# Patient Record
Sex: FEMALE | Race: WHITE | URBAN - METROPOLITAN AREA
[De-identification: names, ages, dates, MRNs, and addresses within clinical notes are randomized per-mention and may not be internally consistent; named-entity substitution may affect disease eponyms.]

---

## 2017-04-24 ENCOUNTER — RADIANT APPOINTMENT (OUTPATIENT)
Dept: BONE DENSITY | Facility: CLINIC | Age: 62
End: 2017-04-24
Payer: COMMERCIAL

## 2017-04-24 ENCOUNTER — RADIANT APPOINTMENT (OUTPATIENT)
Dept: MAMMOGRAPHY | Facility: CLINIC | Age: 62
End: 2017-04-24
Payer: COMMERCIAL

## 2017-04-24 ENCOUNTER — OFFICE VISIT (OUTPATIENT)
Dept: OBGYN | Facility: CLINIC | Age: 62
End: 2017-04-24
Payer: COMMERCIAL

## 2017-04-24 VITALS
HEIGHT: 62 IN | BODY MASS INDEX: 21.53 KG/M2 | SYSTOLIC BLOOD PRESSURE: 104 MMHG | DIASTOLIC BLOOD PRESSURE: 68 MMHG | WEIGHT: 117 LBS

## 2017-04-24 DIAGNOSIS — Z11.8 SCREENING FOR CHLAMYDIAL DISEASE: ICD-10-CM

## 2017-04-24 DIAGNOSIS — Z78.0 ASYMPTOMATIC POSTMENOPAUSAL STATE: ICD-10-CM

## 2017-04-24 DIAGNOSIS — M85.9 LOW BONE DENSITY: ICD-10-CM

## 2017-04-24 DIAGNOSIS — Z11.3 SCREEN FOR STD (SEXUALLY TRANSMITTED DISEASE): ICD-10-CM

## 2017-04-24 DIAGNOSIS — Z11.4 SCREENING FOR HUMAN IMMUNODEFICIENCY VIRUS: ICD-10-CM

## 2017-04-24 DIAGNOSIS — Z11.51 SCREENING FOR HUMAN PAPILLOMAVIRUS: ICD-10-CM

## 2017-04-24 DIAGNOSIS — Z01.419 ENCOUNTER FOR GYNECOLOGICAL EXAMINATION WITHOUT ABNORMAL FINDING: Primary | ICD-10-CM

## 2017-04-24 DIAGNOSIS — Z12.31 VISIT FOR SCREENING MAMMOGRAM: ICD-10-CM

## 2017-04-24 LAB — CALCIUM SERPL-MCNC: 9.4 MG/DL (ref 8.5–10.1)

## 2017-04-24 PROCEDURE — G0145 SCR C/V CYTO,THINLAYER,RESCR: HCPCS | Performed by: OBSTETRICS & GYNECOLOGY

## 2017-04-24 PROCEDURE — 82310 ASSAY OF CALCIUM: CPT | Performed by: OBSTETRICS & GYNECOLOGY

## 2017-04-24 PROCEDURE — 87389 HIV-1 AG W/HIV-1&-2 AB AG IA: CPT | Performed by: OBSTETRICS & GYNECOLOGY

## 2017-04-24 PROCEDURE — 99396 PREV VISIT EST AGE 40-64: CPT | Performed by: OBSTETRICS & GYNECOLOGY

## 2017-04-24 PROCEDURE — 87591 N.GONORRHOEAE DNA AMP PROB: CPT | Performed by: OBSTETRICS & GYNECOLOGY

## 2017-04-24 PROCEDURE — 77080 DXA BONE DENSITY AXIAL: CPT | Performed by: OBSTETRICS & GYNECOLOGY

## 2017-04-24 PROCEDURE — 86696 HERPES SIMPLEX TYPE 2 TEST: CPT | Performed by: OBSTETRICS & GYNECOLOGY

## 2017-04-24 PROCEDURE — 86695 HERPES SIMPLEX TYPE 1 TEST: CPT | Performed by: OBSTETRICS & GYNECOLOGY

## 2017-04-24 PROCEDURE — 86803 HEPATITIS C AB TEST: CPT | Performed by: OBSTETRICS & GYNECOLOGY

## 2017-04-24 PROCEDURE — 86780 TREPONEMA PALLIDUM: CPT | Performed by: OBSTETRICS & GYNECOLOGY

## 2017-04-24 PROCEDURE — 87491 CHLMYD TRACH DNA AMP PROBE: CPT | Performed by: OBSTETRICS & GYNECOLOGY

## 2017-04-24 PROCEDURE — 87340 HEPATITIS B SURFACE AG IA: CPT | Performed by: OBSTETRICS & GYNECOLOGY

## 2017-04-24 PROCEDURE — G0202 SCR MAMMO BI INCL CAD: HCPCS | Mod: TC

## 2017-04-24 PROCEDURE — 36415 COLL VENOUS BLD VENIPUNCTURE: CPT | Performed by: OBSTETRICS & GYNECOLOGY

## 2017-04-24 PROCEDURE — 82306 VITAMIN D 25 HYDROXY: CPT | Performed by: OBSTETRICS & GYNECOLOGY

## 2017-04-24 NOTE — PROGRESS NOTES
Lorri is a 61 year old  female who presents for annual exam.     Besides routine health maintenance,  she would like to discuss STD testing, new partner.    HPI: Patient is here today for her annual exam and STD screening due to new partner.  Her partner reports Jock's itch, and does not want to have intercourse when that is active. She has no history of STDs, and denies dyspareunia, pain, odor, discharge, external rashes or any symptoms at this time. She also reports that she did not start a calcium or vitamin D supplement last year. She has minimal dairy in her diet, but does feel she gets enough vit D from being outside. She has not had this checked, and her last bone density scan was maybe 20 years ago that was found to be low-normal.      The patient does not have a PCP        GYNECOLOGIC HISTORY:    No LMP recorded. Patient is postmenopausal.  Her current contraception method is: menopause.  She  reports that she has never smoked. She has never used smokeless tobacco.    Patient is sexually active.  STD testing offered?  Accepted  Last PHQ-9 score on record =   PHQ-9 SCORE 2016   Total Score 0     Last GAD7 score on record =   MADDI-7 SCORE 2016   Total Score 0     Alcohol Score = 3    HEALTH MAINTENANCE:  Cholesterol: several years ago per patient  Last Mammo: one year ago, Result: normal, Next Mammo: today   Pap: 2016 negative  Colonoscopy:  , Result: normal, Next Colonoscopy: every 2 years  Dexa:  A long time ago per patient    Health maintenance updated:  yes    HISTORY:  Obstetric History       T1      TAB0   SAB0   E0   M0   L1       # Outcome Date GA Lbr Blane/2nd Weight Sex Delivery Anes PTL Lv   1 Term 87    F -SEC   Y          There is no problem list on file for this patient.    Past Surgical History:   Procedure Laterality Date     COLONOSCOPY       HC TOOTH EXTRACTION W/FORCEP       TONSILLECTOMY       WRIST SURGERY Right     cyst excised  "     Social History   Substance Use Topics     Smoking status: Never Smoker     Smokeless tobacco: Never Used     Alcohol use 0.0 oz/week     0 Standard drinks or equivalent per week      Comment: 3-4 glasses of Red wine per week      Problem (# of Occurrences) Relation (Name,Age of Onset)    Breast Cancer (1) Sister            No current outpatient prescriptions on file.     No current facility-administered medications for this visit.      No Known Allergies    Past medical, surgical, social and family histories were reviewed and updated in EPIC.    ROS:   12 point review of systems negative other than symptoms noted below.    EXAM:  Ht 5' 1.5\" (1.562 m)  Wt 117 lb (53.1 kg)  BMI 21.75 kg/m2   BMI: Body mass index is 21.75 kg/(m^2).    PHYSICAL EXAM:  Constitutional:  Appearance: Well nourished, well developed, alert, in no acute distress  Neck:  Lymph Nodes:  No lymphadenopathy present    Thyroid:  Gland size normal, nontender, no nodules or masses present  on palpation  Chest:  Respiratory Effort:  Breathing unlabored. Clear to auscultation throughout.  Cardiovascular:    Heart: Auscultation:  Regular rate, normal rhythm, no murmurs present  Breasts: Inspection of Breasts:  No lymphadenopathy present    Palpation of Breasts and Axillae:  No masses present on palpation, no  breast tenderness    Axillary Lymph Nodes:  No lymphadenopathy present  Gastrointestinal:   Abdominal Examination:  Abdomen nontender to palpation, tone normal without rigidity or guarding, no masses present, umbilicus without lesions. Linear scar from  section.     Liver and Spleen:  No hepatomegaly present, liver nontender to palpation    Hernias:  No hernias present  Lymphatic: Lymph Nodes:  No other lymphadenopathy present  Skin:  General Inspection:  No rashes present, no lesions present, no areas of  discoloration    Genitalia and Groin:  No rashes present, no lesions present, no areas of  discoloration, no masses " present  Neurologic/Psychiatric:    Mental Status:  Oriented X3     Pelvic Exam:  External Genitalia:     Normal appearance for age, no discharge present, no tenderness present, no inflammatory lesions present, color normal  Vagina:     Normal vaginal vault without central or paravaginal defects, no discharge present, no inflammatory lesions present, no masses present  Bladder:     Nontender to palpation  Urethra:   Urethral Body:  Urethra palpation normal, urethra structural support normal   Urethral Meatus:  No erythema or lesions present  Cervix:     Appearance healthy, no lesions present, nontender to palpation, no bleeding present  Uterus:     Nontender to palpation, no masses present, position anteflexed, mobility: normal  Adnexa:     No adnexal tenderness present, no adnexal masses present  Perineum:     Perineum within normal limits, no evidence of trauma, no rashes or skin lesions present  Anus:     Anus within normal limits, no hemorrhoids present  Inguinal Lymph Nodes:     No lymphadenopathy present  Pubic Hair:     Normal pubic hair distribution for age  Genitalia and Groin:     No rashes present, no lesions present, no areas of discoloration, no masses present    COUNSELING:   Reviewed preventive health counseling, as reflected in patient instructions       Healthy diet/nutrition       Safe sex practices/STD prevention       Consider Hep C screening for patients born between 1945 and        HIV screeninx in teen years, 1x in adult years, and at intervals if high risk       Bone Density Scan    BMI: Body mass index is 21.75 kg/(m^2).      ASSESSMENT:  61 year old female with satisfactory annual exam.  No diagnosis found.    PLAN:  Mammogram completed, will make appointment for bone density scan. Patient encouraged to begin vitamin D and calcium supplementation for bone health, and will follow-up with results from that scan. Will follow-up with lab evaluation for STD and cervical cancer screening  as well. She should return in 1 year for her annual exam, or sooner if questions, concerns or symptoms arise.     Erick Marquez MD

## 2017-04-24 NOTE — MR AVS SNAPSHOT
"              After Visit Summary   4/24/2017    Lorri Martinez    MRN: [id number]           Patient Information     Date Of Birth          1955        Visit Information        Provider Department      4/24/2017 11:30 AM Erick Marquez MD [facility]        Today's Diagnoses     Encounter for gynecological examination without abnormal finding    -  1    Screening for chlamydial disease        Screening for human immunodeficiency virus        Screen for STD (sexually transmitted disease)           Follow-ups after your visit        Who to contact     If you have questions or need follow up information about today's clinic visit or your schedule please contact [facility] directly at [phone].  Normal or non-critical lab and imaging results will be communicated to you by MyChart, letter or phone within 4 business days after the clinic has received the results. If you do not hear from us within 7 days, please contact the clinic through MyChart or phone. If you have a critical or abnormal lab result, we will notify you by phone as soon as possible.  Submit refill requests through [employer] or call your pharmacy and they will forward the refill request to us. Please allow 3 business days for your refill to be completed.          Additional Information About Your Visit        MyChart Information     [employer] lets you send messages to your doctor, view your test results, renew your prescriptions, schedule appointments and more. To sign up, go to [email or web address] . Click on \"Log in\" on the left side of the screen, which will take you to the Welcome page. Then click on \"Sign up Now\" on the right side of the page.     You will be asked to enter the access code listed below, as well as some personal information. Please follow the directions to create your username and password.     Your access code is: [email or web address]  Expires: 7/23/2017 12:19 PM     Your access code will " " in 90 days. If you need help or a new code, please call your [place] clinic or [phone].        Care EveryWhere ID     This is your Care EveryWhere ID. This could be used by other organizations to access your [place] medical records  [id number]        Your Vitals Were     Height BMI (Body Mass Index)                1.562 m (5' 1.5\") 21.75 kg/m2           Blood Pressure from Last 3 Encounters:   17 104/68   16 108/76    Weight from Last 3 Encounters:   17 53.1 kg (117 lb)   16 53.1 kg (117 lb)              We Performed the Following     Anti Treponema     CHLAMYDIA TRACHOMATIS PCR     Hepatitis B Surface  Antigen     Hepatitis C Antibody     Herpes Simplex Virus 1 and 2 IgG     HIV Antigen Antibody Combo     NEISSERIA GONORRHOEA PCR     Pap imaged thin layer screen reflex to HPV if ASCUS - recommended age 25 - 29 years        Primary Care Provider    None Specified       No primary provider on file.        Thank you!     Thank you for choosing [facility]  for your care. Our goal is always to provide you with excellent care. Hearing back from our patients is one way we can continue to improve our services. Please take a few minutes to complete the written survey that you may receive in the mail after your visit with us. Thank you!             Your Updated Medication List - Protect others around you: Learn how to safely use, store and throw away your medicines at www.disposemymeds.org.      Notice  As of 2017 12:19 PM    You have not been prescribed any medications.      "

## 2017-04-24 NOTE — LETTER
May 5, 2017    Lorri Martinez  [address] [place] [address]    Dear Lorri,  We are happy to inform you that your PAP smear result from 4/24/17 is normal.  We are now able to do a follow up test on PAP smears. The DNA test is for HPV (Human Papilloma Virus). Cervical cancer is closely linked with certain types of HPV. Your result showed no evidence of high risk HPV.  Therefore we recommend you return in 3 years for your next pap smear and HPV test.  You will still need to return to the clinic every year for an annual exam and other preventive tests.  Please contact the clinic at [phone] with any questions.  Sincerely,    Erick Marquez MD/syeda

## 2017-04-25 LAB
DEPRECATED CALCIDIOL+CALCIFEROL SERPL-MC: 25 UG/L (ref 20–75)
HBV SURFACE AG SERPL QL IA: NONREACTIVE
HCV AB SERPL QL IA: NORMAL
HIV 1+2 AB+HIV1 P24 AG SERPL QL IA: NORMAL
HSV1 IGG SERPL QL IA: 0.2 AI (ref 0–0.8)
HSV2 IGG SERPL QL IA: ABNORMAL AI (ref 0–0.8)
T PALLIDUM IGG+IGM SER QL: NEGATIVE

## 2017-04-26 LAB
C TRACH DNA SPEC QL NAA+PROBE: NORMAL
COPATH REPORT: NORMAL
N GONORRHOEA DNA SPEC QL NAA+PROBE: NORMAL
PAP: NORMAL
SPECIMEN SOURCE: NORMAL
SPECIMEN SOURCE: NORMAL

## 2017-05-01 PROCEDURE — 87624 HPV HI-RISK TYP POOLED RSLT: CPT | Performed by: OBSTETRICS & GYNECOLOGY

## 2017-05-04 LAB
FINAL DIAGNOSIS: NORMAL
HPV HR 12 DNA CVX QL NAA+PROBE: NEGATIVE
HPV16 DNA SPEC QL NAA+PROBE: NEGATIVE
HPV18 DNA SPEC QL NAA+PROBE: NEGATIVE
SPECIMEN DESCRIPTION: NORMAL